# Patient Record
(demographics unavailable — no encounter records)

---

## 2025-02-20 NOTE — HISTORY OF PRESENT ILLNESS
[Mother] : mother [Normal] : Normal [Yes] : Patient goes to dentist yearly [In nursery school] : In nursery school [No] : No cigarette smoke exposure [Water heater temperature set at <120 degrees F] : Water heater temperature set at <120 degrees F [Car seat in back seat] : Car seat in back seat [Vitamin] : Primary Fluoride Source: Vitamin [Up to date] : Up to date [de-identified] : well varied [FreeTextEntry8] : still wearing diapers [FreeTextEntry9] : M,W,F school for 3 hours a day. Otherwise cared for by family members.

## 2025-02-20 NOTE — DISCUSSION/SUMMARY
[Normal Growth] : growth [Normal Development] : development [None] : No known medical problems [No Elimination Concerns] : elimination [No Feeding Concerns] : feeding [No Skin Concerns] : skin [Normal Sleep Pattern] : sleep [Social Development] : social development [ Considerations] :  considerations [No Medications] : ~He/She~ is not on any medications [Mother] : mother [] : The components of the vaccine(s) to be administered today are listed in the plan of care. The disease(s) for which the vaccine(s) are intended to prevent and the risks have been discussed with the caretaker.  The risks are also included in the appropriate vaccination information statements which have been provided to the patient's caregiver.  The caregiver has given consent to vaccinate. [FreeTextEntry1] : Discussed components of 5-2-1-0, healthy active living with patient and family.  Recommended 5 servings of fruits and vegetables per day, less than 2 hours of screen time per day, 1 hour of exercise per day, and ZERO sugar sweetened beverages.

## 2025-02-20 NOTE — PHYSICAL EXAM
[Alert] : alert [No Acute Distress] : no acute distress [Playful] : playful [Normocephalic] : normocephalic [Conjunctivae with no discharge] : conjunctivae with no discharge [EOMI Bilateral] : EOMI bilateral [Auricles Well Formed] : auricles well formed [Clear Tympanic membranes with present light reflex and bony landmarks] : clear tympanic membranes with present light reflex and bony landmarks [No Discharge] : no discharge [Nares Patent] : nares patent [Palate Intact] : palate intact [Uvula Midline] : uvula midline [Nonerythematous Oropharynx] : nonerythematous oropharynx [No Caries] : no caries [Trachea Midline] : trachea midline [Supple, full passive range of motion] : supple, full passive range of motion [No Palpable Masses] : no palpable masses [Symmetric Chest Rise] : symmetric chest rise [Clear to Auscultation Bilaterally] : clear to auscultation bilaterally [Normoactive Precordium] : normoactive precordium [Regular Rate and Rhythm] : regular rate and rhythm [Normal S1, S2 present] : normal S1, S2 present [No Murmurs] : no murmurs [+2 Femoral Pulses] : +2 femoral pulses [Soft] : soft [NonTender] : non tender [Non Distended] : non distended [Normoactive Bowel Sounds] : normoactive bowel sounds [No Hepatomegaly] : no hepatomegaly [No Splenomegaly] : no splenomegaly [Thiago 1] : Thiago 1 [No Clitoromegaly] : no clitoromegaly [Normal Vagina Introitus] : normal vagina introitus [Patent] : patent [No Abnormal Lymph Nodes Palpated] : no abnormal lymph nodes palpated [Symmetric Hip Rotation] : symmetric hip rotation [No Gait Asymmetry] : no gait asymmetry [No pain or deformities with palpation of bone, muscles, joints] : no pain or deformities with palpation of bone, muscles, joints [Normal Muscle Tone] : normal muscle tone [No Spinal Dimple] : no spinal dimple [NoTuft of Hair] : no tuft of hair [Straight] : straight [Cranial Nerves Grossly Intact] : cranial nerves grossly intact [No Rash or Lesions] : no rash or lesions

## 2025-06-12 NOTE — HISTORY OF PRESENT ILLNESS
[de-identified] : cough [FreeTextEntry6] : Presents with c/o cough/congestion x 2 days - cough worse at night - ?barky.  NO fever - Tmax 99.  Meds given: Sindhu cough/cold.  Tylenol yesterday.  Appetite/activity at baseline, drinking well, good UO.  No vomiting/No diarrhea.   + school/sick contacts.

## 2025-06-12 NOTE — DISCUSSION/SUMMARY
[FreeTextEntry1] :  2 year old female with mild croup, no signs of respiratory distress on exam Recommend using cool mist from a humidifier. Allow the child to breathe cool air during the night by opening a window or door. Treat fever with Tylenol/Motrin - dosing/intervals/indications reviewed.   Coughing can be treated with warm, clear fluids to loosen mucus on the vocal cords. Keep the child's head elevated. May use cetirizine qHS prn congestion/runny nose. If any stridor at rest or worsening cough go to ED or contact health care provider immediately. d/w parent when steroids are indicated - if cough worsens will start as directed x 3d. If worsens will return for re-eval. RED FLAGS REVIEWED - indications for ED eval discussed, signs of respiratory distress/dehydration reviewed - Mom agrees with plan, demonstrates an understanding, is able to repeat back instructions and has no questions at this time.  Encouraged good hydration and normal activity & diet. Return sooner PRN. Well care as scheduled.

## 2025-06-12 NOTE — PHYSICAL EXAM
[NL] : warm, clear [Playful] : playful [Clear Rhinorrhea] : clear rhinorrhea [Tired appearing] : not tired appearing [Toxic] : not toxic [Wheezing] : no wheezing [Subcostal Retractions] : no subcostal retractions [Suprasternal Retractions] : no suprasternal retractions

## 2025-06-12 NOTE — HISTORY OF PRESENT ILLNESS
[de-identified] : cough [FreeTextEntry6] : Presents with c/o cough/congestion x 2 days - cough worse at night - ?barky.  NO fever - Tmax 99.  Meds given: Sindhu cough/cold.  Tylenol yesterday.  Appetite/activity at baseline, drinking well, good UO.  No vomiting/No diarrhea.   + school/sick contacts.

## 2025-07-08 NOTE — DISCUSSION/SUMMARY
[FreeTextEntry1] :  2 year female with fever/mouth pain. Likely viral - no indication for antibiotic use at this time.   Rapid strep done = result neg, TCX sent will follow up with results.  RVP ordered - will follow.  d/w mom NO meningeal signs/red flags on exam. Reviewed signs to monitor for.  Headache is likely secondary to fever.  Supportive care reviewed -- Alternate Motrin/Tylenol PRN fever, Zyrtec as directed if congestion, Nasal saline PRN, humidifier.   d/w mom s/s may progress over next few days - will monitor for additional symptoms/rash.  Good hydration discussed & good hand hygiene reviewed  Will give fluid containing foods - purees/soups/popsicles to help with hydration.  If fever persists > 48hr return for re-eval. RED FLAGS REVIEWED - indications for ED eval discussed, signs of distress/dehydration reviewed - mom agrees with plan, demonstrates an understanding, is able to repeat back instructions and has no questions at this time.   Once feeling better may resume normal activity & diet.  Will follow up tomorrow with results.  Return sooner PRN.  Well care as scheduled.

## 2025-07-08 NOTE — HISTORY OF PRESENT ILLNESS
[de-identified] : fever [FreeTextEntry6] : Presents with c/o fever - low grade fever started on Sunday - seemed to feel fine yesterday during the day- last night had temp 100.8 gave Tylenol - 330am temp spiked to 103.5 gave Tylenol.  This morning temp again 103.7 gave Tylenol & then Motrin with last dose of Motrin 10:30am.  NO cough/congestion.   Did  c/o headache during fever but now seems resolved and may have mouth/throat pain - is telling mom that her juice tastes yucky.  Meds given: Tylenol/Motrin PRN.  Appetite less - had some crackers and a yogurt/smoothie drink this morning/activity improved now that afebrile, drinking ok. Last UO this morning.  No vomiting/No diarrhea.   + around other kids over the weekend & contacts with coxsackie few days prior.

## 2025-07-08 NOTE — PHYSICAL EXAM
[Consolable] : consolable [Erythematous Oropharynx] : erythematous oropharynx [NL] : warm, clear [Playful] : playful [Anterior Cervical] : anterior cervical [Lethargic] : not lethargic [Irritable] : not irritable [Toxic] : not toxic [Stridor] : no stridor [Conjuctival Injection] : no conjunctival injection [Discharge] : no discharge [Clear Rhinorrhea] : no rhinorrhea [Inflamed Nasal Mucosa] : inflamed nasal mucosa [FreeTextEntry5] : no light sensitivity. PERRL.  [de-identified] : no meningeal signs.

## 2025-07-08 NOTE — HISTORY OF PRESENT ILLNESS
[de-identified] : fever [FreeTextEntry6] : Presents with c/o fever - low grade fever started on Sunday - seemed to feel fine yesterday during the day- last night had temp 100.8 gave Tylenol - 330am temp spiked to 103.5 gave Tylenol.  This morning temp again 103.7 gave Tylenol & then Motrin with last dose of Motrin 10:30am.  NO cough/congestion.   Did  c/o headache during fever but now seems resolved and may have mouth/throat pain - is telling mom that her juice tastes yucky.  Meds given: Tylenol/Motrin PRN.  Appetite less - had some crackers and a yogurt/smoothie drink this morning/activity improved now that afebrile, drinking ok. Last UO this morning.  No vomiting/No diarrhea.   + around other kids over the weekend & contacts with coxsackie few days prior.